# Patient Record
Sex: FEMALE | Race: WHITE | NOT HISPANIC OR LATINO | Employment: FULL TIME | URBAN - METROPOLITAN AREA
[De-identification: names, ages, dates, MRNs, and addresses within clinical notes are randomized per-mention and may not be internally consistent; named-entity substitution may affect disease eponyms.]

---

## 2023-05-08 DIAGNOSIS — E03.9 ACQUIRED HYPOTHYROIDISM: Primary | ICD-10-CM

## 2023-05-08 RX ORDER — LEVOTHYROXINE SODIUM 137 UG/1
TABLET ORAL
Qty: 90 TABLET | Refills: 1 | Status: SHIPPED | OUTPATIENT
Start: 2023-05-08 | End: 2023-10-23

## 2023-05-08 RX ORDER — LEVOTHYROXINE SODIUM 137 UG/1
137 TABLET ORAL DAILY
COMMUNITY

## 2023-07-13 RX ORDER — MULTIVITAMIN
1 TABLET ORAL DAILY
COMMUNITY
Start: 2018-07-16

## 2023-08-07 ASSESSMENT — PROMIS GLOBAL HEALTH SCALE
RATE_GENERAL_HEALTH: VERY GOOD
RATE_QUALITY_OF_LIFE: VERY GOOD
RATE_AVERAGE_PAIN: 0
RATE_SOCIAL_SATISFACTION: VERY GOOD
CARRYOUT_PHYSICAL_ACTIVITIES: COMPLETELY
RATE_AVERAGE_FATIGUE: MODERATE
RATE_MENTAL_HEALTH: VERY GOOD
EMOTIONAL_PROBLEMS: RARELY
CARRYOUT_SOCIAL_ACTIVITIES: VERY GOOD
RATE_PHYSICAL_HEALTH: VERY GOOD

## 2023-08-09 ENCOUNTER — OFFICE VISIT (OUTPATIENT)
Dept: PRIMARY CARE | Facility: CLINIC | Age: 48
End: 2023-08-09
Payer: COMMERCIAL

## 2023-08-09 ENCOUNTER — LAB (OUTPATIENT)
Dept: LAB | Facility: LAB | Age: 48
End: 2023-08-09
Payer: COMMERCIAL

## 2023-08-09 VITALS
WEIGHT: 158 LBS | BODY MASS INDEX: 25.39 KG/M2 | DIASTOLIC BLOOD PRESSURE: 78 MMHG | RESPIRATION RATE: 16 BRPM | HEIGHT: 66 IN | OXYGEN SATURATION: 98 % | HEART RATE: 88 BPM | TEMPERATURE: 96.8 F | SYSTOLIC BLOOD PRESSURE: 112 MMHG

## 2023-08-09 DIAGNOSIS — Z00.00 HEALTH MAINTENANCE EXAMINATION: ICD-10-CM

## 2023-08-09 DIAGNOSIS — Z00.00 HEALTHCARE MAINTENANCE: ICD-10-CM

## 2023-08-09 DIAGNOSIS — E03.9 HYPOTHYROIDISM, UNSPECIFIED TYPE: ICD-10-CM

## 2023-08-09 DIAGNOSIS — F51.01 PRIMARY INSOMNIA: Primary | ICD-10-CM

## 2023-08-09 DIAGNOSIS — Z12.31 ENCOUNTER FOR SCREENING MAMMOGRAM FOR MALIGNANT NEOPLASM OF BREAST: ICD-10-CM

## 2023-08-09 LAB
ALANINE AMINOTRANSFERASE (SGPT) (U/L) IN SER/PLAS: 15 U/L (ref 7–45)
ALBUMIN (G/DL) IN SER/PLAS: 4.2 G/DL (ref 3.4–5)
ALKALINE PHOSPHATASE (U/L) IN SER/PLAS: 44 U/L (ref 33–110)
ANION GAP IN SER/PLAS: 11 MMOL/L (ref 10–20)
ASPARTATE AMINOTRANSFERASE (SGOT) (U/L) IN SER/PLAS: 20 U/L (ref 9–39)
BILIRUBIN TOTAL (MG/DL) IN SER/PLAS: 0.9 MG/DL (ref 0–1.2)
CALCIUM (MG/DL) IN SER/PLAS: 9.3 MG/DL (ref 8.6–10.3)
CARBON DIOXIDE, TOTAL (MMOL/L) IN SER/PLAS: 26 MMOL/L (ref 21–32)
CHLORIDE (MMOL/L) IN SER/PLAS: 104 MMOL/L (ref 98–107)
CHOLESTEROL (MG/DL) IN SER/PLAS: 185 MG/DL (ref 0–199)
CHOLESTEROL IN HDL (MG/DL) IN SER/PLAS: 56.2 MG/DL
CHOLESTEROL/HDL RATIO: 3.3
CREATININE (MG/DL) IN SER/PLAS: 0.71 MG/DL (ref 0.5–1.05)
ERYTHROCYTE DISTRIBUTION WIDTH (RATIO) BY AUTOMATED COUNT: 11.9 % (ref 11.5–14.5)
ERYTHROCYTE MEAN CORPUSCULAR HEMOGLOBIN CONCENTRATION (G/DL) BY AUTOMATED: 34 G/DL (ref 32–36)
ERYTHROCYTE MEAN CORPUSCULAR VOLUME (FL) BY AUTOMATED COUNT: 93 FL (ref 80–100)
ERYTHROCYTES (10*6/UL) IN BLOOD BY AUTOMATED COUNT: 4.24 X10E12/L (ref 4–5.2)
GFR FEMALE: >90 ML/MIN/1.73M2
GLUCOSE (MG/DL) IN SER/PLAS: 84 MG/DL (ref 74–99)
HEMATOCRIT (%) IN BLOOD BY AUTOMATED COUNT: 39.4 % (ref 36–46)
HEMOGLOBIN (G/DL) IN BLOOD: 13.4 G/DL (ref 12–16)
LDL: 111 MG/DL (ref 0–99)
LEUKOCYTES (10*3/UL) IN BLOOD BY AUTOMATED COUNT: 5.8 X10E9/L (ref 4.4–11.3)
PLATELETS (10*3/UL) IN BLOOD AUTOMATED COUNT: 266 X10E9/L (ref 150–450)
POTASSIUM (MMOL/L) IN SER/PLAS: 4.2 MMOL/L (ref 3.5–5.3)
PROTEIN TOTAL: 7.2 G/DL (ref 6.4–8.2)
SODIUM (MMOL/L) IN SER/PLAS: 137 MMOL/L (ref 136–145)
THYROTROPIN (MIU/L) IN SER/PLAS BY DETECTION LIMIT <= 0.05 MIU/L: 1.93 MIU/L (ref 0.44–3.98)
TRIGLYCERIDE (MG/DL) IN SER/PLAS: 87 MG/DL (ref 0–149)
UREA NITROGEN (MG/DL) IN SER/PLAS: 11 MG/DL (ref 6–23)
VLDL: 17 MG/DL (ref 0–40)

## 2023-08-09 PROCEDURE — 80061 LIPID PANEL: CPT

## 2023-08-09 PROCEDURE — 36415 COLL VENOUS BLD VENIPUNCTURE: CPT

## 2023-08-09 PROCEDURE — 85027 COMPLETE CBC AUTOMATED: CPT

## 2023-08-09 PROCEDURE — 80053 COMPREHEN METABOLIC PANEL: CPT

## 2023-08-09 PROCEDURE — 84443 ASSAY THYROID STIM HORMONE: CPT

## 2023-08-09 PROCEDURE — 1036F TOBACCO NON-USER: CPT | Performed by: INTERNAL MEDICINE

## 2023-08-09 PROCEDURE — 99396 PREV VISIT EST AGE 40-64: CPT | Performed by: INTERNAL MEDICINE

## 2023-08-09 ASSESSMENT — PATIENT HEALTH QUESTIONNAIRE - PHQ9
2. FEELING DOWN, DEPRESSED OR HOPELESS: NOT AT ALL
SUM OF ALL RESPONSES TO PHQ9 QUESTIONS 1 AND 2: 0
1. LITTLE INTEREST OR PLEASURE IN DOING THINGS: NOT AT ALL

## 2023-08-09 ASSESSMENT — ENCOUNTER SYMPTOMS
UNEXPECTED WEIGHT CHANGE: 0
DIFFICULTY URINATING: 0
VOICE CHANGE: 0
ACTIVITY CHANGE: 0
COUGH: 0
ARTHRALGIAS: 0
LIGHT-HEADEDNESS: 0
HEMATURIA: 0
DIZZINESS: 0
BLOOD IN STOOL: 0
CONSTIPATION: 0
NAUSEA: 0
APPETITE CHANGE: 0
WHEEZING: 0
VOMITING: 0
FATIGUE: 0
TROUBLE SWALLOWING: 0
CHEST TIGHTNESS: 0
HEADACHES: 0
DIARRHEA: 0
PALPITATIONS: 0
TREMORS: 0

## 2023-08-09 NOTE — PROGRESS NOTES
Subjective   Patient ID: Karime Bailey is a 47 y.o. female who presents for Annual Exam.  46 yo here for a physical    No trouble falling asleep - wakes up at 2 am and is up till 6 am  Does not like how she feels on Melatonin  Tried CBD gummies  Advil pm consistently works    Caffeine - 1 cup in am - none after  Alcohol - weekends only - 2-3 each night  Exericises in morning    No anxiety          Review of Systems   Constitutional:  Negative for activity change, appetite change, fatigue and unexpected weight change.   HENT:  Negative for ear pain, hearing loss, tinnitus, trouble swallowing and voice change.    Eyes:  Negative for visual disturbance.   Respiratory:  Negative for cough, chest tightness and wheezing.    Cardiovascular:  Negative for chest pain, palpitations and leg swelling.   Gastrointestinal:  Negative for blood in stool, constipation, diarrhea, nausea and vomiting.   Genitourinary:  Negative for difficulty urinating, hematuria and vaginal bleeding.   Musculoskeletal:  Negative for arthralgias.   Skin:  Negative for rash.   Neurological:  Negative for dizziness, tremors, syncope, light-headedness and headaches.       Objective   Physical Exam  Constitutional:       General: She is not in acute distress.     Appearance: She is well-developed. She is not diaphoretic.   HENT:      Head: Normocephalic.      Right Ear: Tympanic membrane normal. There is no impacted cerumen.      Left Ear: Tympanic membrane normal. There is no impacted cerumen.      Nose: Nose normal.      Mouth/Throat:      Mouth: Mucous membranes are moist.      Pharynx: Oropharynx is clear. No oropharyngeal exudate or posterior oropharyngeal erythema.   Eyes:      General: No scleral icterus.     Extraocular Movements: Extraocular movements intact.      Conjunctiva/sclera: Conjunctivae normal.      Pupils: Pupils are equal, round, and reactive to light.   Neck:      Thyroid: No thyromegaly.      Vascular: No JVD.   Cardiovascular:       Rate and Rhythm: Normal rate and regular rhythm.      Pulses: Normal pulses.      Heart sounds: Normal heart sounds. No murmur heard.     No friction rub. No gallop.   Pulmonary:      Effort: Pulmonary effort is normal. No respiratory distress.      Breath sounds: Normal breath sounds. No wheezing or rales.   Chest:      Chest wall: No tenderness.   Abdominal:      General: Bowel sounds are normal. There is no distension.      Palpations: Abdomen is soft. There is no mass.      Tenderness: There is no abdominal tenderness. There is no rebound.   Musculoskeletal:         General: Normal range of motion.      Cervical back: Normal range of motion and neck supple.   Lymphadenopathy:      Cervical: No cervical adenopathy.   Skin:     General: Skin is warm and dry.   Neurological:      General: No focal deficit present.      Mental Status: She is alert and oriented to person, place, and time.      Deep Tendon Reflexes: Reflexes normal.   Psychiatric:         Mood and Affect: Mood normal.         Thought Content: Thought content normal.         Assessment/Plan   Problem List Items Addressed This Visit       Hypothyroidism    Primary insomnia - Primary    Current Assessment & Plan     Will try Melatonin or continue Benadryl 12.5-25  At hs          Other Visit Diagnoses       Health maintenance examination        Relevant Orders    Lipid Panel    TSH with reflex to Free T4 if abnormal    CBC    Healthcare maintenance        Relevant Orders    Comprehensive Metabolic Panel    Encounter for screening mammogram for malignant neoplasm of breast        Relevant Orders    BI mammo bilateral screening tomosynthesis

## 2023-10-21 DIAGNOSIS — E03.9 ACQUIRED HYPOTHYROIDISM: ICD-10-CM

## 2023-10-23 RX ORDER — LEVOTHYROXINE SODIUM 137 UG/1
137 TABLET ORAL DAILY
Qty: 90 TABLET | Refills: 1 | Status: SHIPPED | OUTPATIENT
Start: 2023-10-23

## 2023-11-28 ENCOUNTER — ANCILLARY PROCEDURE (OUTPATIENT)
Dept: RADIOLOGY | Facility: CLINIC | Age: 48
End: 2023-11-28
Payer: COMMERCIAL

## 2023-11-28 DIAGNOSIS — Z12.31 ENCOUNTER FOR SCREENING MAMMOGRAM FOR MALIGNANT NEOPLASM OF BREAST: ICD-10-CM

## 2023-11-28 PROCEDURE — 77063 BREAST TOMOSYNTHESIS BI: CPT | Mod: BILATERAL PROCEDURE | Performed by: RADIOLOGY

## 2023-11-28 PROCEDURE — 77067 SCR MAMMO BI INCL CAD: CPT | Mod: BILATERAL PROCEDURE | Performed by: RADIOLOGY

## 2023-11-28 PROCEDURE — 77063 BREAST TOMOSYNTHESIS BI: CPT

## 2023-11-28 PROCEDURE — 77067 SCR MAMMO BI INCL CAD: CPT

## 2024-07-19 DIAGNOSIS — E03.9 ACQUIRED HYPOTHYROIDISM: ICD-10-CM

## 2024-07-19 RX ORDER — LEVOTHYROXINE SODIUM 137 UG/1
137 TABLET ORAL DAILY
Qty: 90 TABLET | Refills: 0 | Status: SHIPPED | OUTPATIENT
Start: 2024-07-19

## 2024-08-12 ASSESSMENT — PROMIS GLOBAL HEALTH SCALE
CARRYOUT_PHYSICAL_ACTIVITIES: COMPLETELY
EMOTIONAL_PROBLEMS: RARELY
RATE_AVERAGE_PAIN: 0
RATE_GENERAL_HEALTH: VERY GOOD
RATE_MENTAL_HEALTH: VERY GOOD
RATE_QUALITY_OF_LIFE: VERY GOOD
CARRYOUT_SOCIAL_ACTIVITIES: VERY GOOD
RATE_PHYSICAL_HEALTH: VERY GOOD
RATE_AVERAGE_FATIGUE: MODERATE
RATE_SOCIAL_SATISFACTION: VERY GOOD

## 2024-08-14 ENCOUNTER — LAB (OUTPATIENT)
Dept: LAB | Facility: LAB | Age: 49
End: 2024-08-14
Payer: COMMERCIAL

## 2024-08-14 ENCOUNTER — APPOINTMENT (OUTPATIENT)
Dept: PRIMARY CARE | Facility: CLINIC | Age: 49
End: 2024-08-14
Payer: COMMERCIAL

## 2024-08-14 VITALS
RESPIRATION RATE: 16 BRPM | TEMPERATURE: 97.3 F | OXYGEN SATURATION: 99 % | WEIGHT: 133 LBS | HEIGHT: 66 IN | BODY MASS INDEX: 21.38 KG/M2 | SYSTOLIC BLOOD PRESSURE: 103 MMHG | DIASTOLIC BLOOD PRESSURE: 67 MMHG | HEART RATE: 78 BPM

## 2024-08-14 DIAGNOSIS — R07.89 ATYPICAL CHEST PAIN: ICD-10-CM

## 2024-08-14 DIAGNOSIS — Z00.00 ROUTINE GENERAL MEDICAL EXAMINATION AT A HEALTH CARE FACILITY: Primary | ICD-10-CM

## 2024-08-14 DIAGNOSIS — Z12.31 VISIT FOR SCREENING MAMMOGRAM: ICD-10-CM

## 2024-08-14 DIAGNOSIS — E03.9 ACQUIRED HYPOTHYROIDISM: ICD-10-CM

## 2024-08-14 DIAGNOSIS — Z00.00 ROUTINE GENERAL MEDICAL EXAMINATION AT A HEALTH CARE FACILITY: ICD-10-CM

## 2024-08-14 DIAGNOSIS — F51.01 PRIMARY INSOMNIA: ICD-10-CM

## 2024-08-14 LAB
ALBUMIN SERPL BCP-MCNC: 4.1 G/DL (ref 3.4–5)
ALP SERPL-CCNC: 43 U/L (ref 33–110)
ALT SERPL W P-5'-P-CCNC: 13 U/L (ref 7–45)
ANION GAP SERPL CALC-SCNC: 10 MMOL/L (ref 10–20)
AST SERPL W P-5'-P-CCNC: 18 U/L (ref 9–39)
BILIRUB SERPL-MCNC: 1.3 MG/DL (ref 0–1.2)
BUN SERPL-MCNC: 14 MG/DL (ref 6–23)
CALCIUM SERPL-MCNC: 9.3 MG/DL (ref 8.6–10.6)
CHLORIDE SERPL-SCNC: 104 MMOL/L (ref 98–107)
CHOLEST SERPL-MCNC: 172 MG/DL (ref 0–199)
CHOLESTEROL/HDL RATIO: 2.7
CO2 SERPL-SCNC: 27 MMOL/L (ref 21–32)
CREAT SERPL-MCNC: 0.75 MG/DL (ref 0.5–1.05)
EGFRCR SERPLBLD CKD-EPI 2021: >90 ML/MIN/1.73M*2
ERYTHROCYTE [DISTWIDTH] IN BLOOD BY AUTOMATED COUNT: 11.4 % (ref 11.5–14.5)
GLUCOSE SERPL-MCNC: 84 MG/DL (ref 74–99)
HCT VFR BLD AUTO: 43.1 % (ref 36–46)
HDLC SERPL-MCNC: 64.5 MG/DL
HGB BLD-MCNC: 14.1 G/DL (ref 12–16)
LDLC SERPL CALC-MCNC: 90 MG/DL
MCH RBC QN AUTO: 31.1 PG (ref 26–34)
MCHC RBC AUTO-ENTMCNC: 32.7 G/DL (ref 32–36)
MCV RBC AUTO: 95 FL (ref 80–100)
NON HDL CHOLESTEROL: 108 MG/DL (ref 0–149)
NRBC BLD-RTO: 0 /100 WBCS (ref 0–0)
PLATELET # BLD AUTO: 247 X10*3/UL (ref 150–450)
POTASSIUM SERPL-SCNC: 4.1 MMOL/L (ref 3.5–5.3)
PROT SERPL-MCNC: 7.2 G/DL (ref 6.4–8.2)
RBC # BLD AUTO: 4.54 X10*6/UL (ref 4–5.2)
SODIUM SERPL-SCNC: 137 MMOL/L (ref 136–145)
TRIGL SERPL-MCNC: 87 MG/DL (ref 0–149)
TSH SERPL-ACNC: 2.42 MIU/L (ref 0.44–3.98)
VLDL: 17 MG/DL (ref 0–40)
WBC # BLD AUTO: 5.9 X10*3/UL (ref 4.4–11.3)

## 2024-08-14 PROCEDURE — 85027 COMPLETE CBC AUTOMATED: CPT

## 2024-08-14 PROCEDURE — 36415 COLL VENOUS BLD VENIPUNCTURE: CPT

## 2024-08-14 PROCEDURE — 1036F TOBACCO NON-USER: CPT | Performed by: INTERNAL MEDICINE

## 2024-08-14 PROCEDURE — 84443 ASSAY THYROID STIM HORMONE: CPT

## 2024-08-14 PROCEDURE — 80053 COMPREHEN METABOLIC PANEL: CPT

## 2024-08-14 PROCEDURE — 93000 ELECTROCARDIOGRAM COMPLETE: CPT | Performed by: INTERNAL MEDICINE

## 2024-08-14 PROCEDURE — 99396 PREV VISIT EST AGE 40-64: CPT | Performed by: INTERNAL MEDICINE

## 2024-08-14 PROCEDURE — 80061 LIPID PANEL: CPT

## 2024-08-14 PROCEDURE — 3008F BODY MASS INDEX DOCD: CPT | Performed by: INTERNAL MEDICINE

## 2024-08-14 RX ORDER — SAME BUTANEDISULFONATE/BETAINE 400-600 MG
POWDER IN PACKET (EA) ORAL
COMMUNITY

## 2024-08-14 RX ORDER — LEVOTHYROXINE SODIUM 137 UG/1
137 TABLET ORAL DAILY
Qty: 90 TABLET | Refills: 3 | Status: SHIPPED | OUTPATIENT
Start: 2024-08-14 | End: 2025-08-14

## 2024-08-14 RX ORDER — LEVOTHYROXINE SODIUM 137 UG/1
137 TABLET ORAL DAILY
Qty: 90 TABLET | Refills: 0 | Status: SHIPPED | OUTPATIENT
Start: 2024-08-14 | End: 2024-08-14

## 2024-08-14 ASSESSMENT — ENCOUNTER SYMPTOMS
VOMITING: 0
CHEST TIGHTNESS: 1
WHEEZING: 0
UNEXPECTED WEIGHT CHANGE: 0
DIZZINESS: 0
ARTHRALGIAS: 0
CONSTIPATION: 0
COUGH: 0
PALPITATIONS: 0
HEMATURIA: 0
TREMORS: 0
APPETITE CHANGE: 0
TROUBLE SWALLOWING: 0
DIARRHEA: 0
BLOOD IN STOOL: 0
NAUSEA: 0
HEADACHES: 0
ACTIVITY CHANGE: 0
VOICE CHANGE: 0
FATIGUE: 0
DIFFICULTY URINATING: 0
LIGHT-HEADEDNESS: 0

## 2024-08-14 ASSESSMENT — PATIENT HEALTH QUESTIONNAIRE - PHQ9
1. LITTLE INTEREST OR PLEASURE IN DOING THINGS: NOT AT ALL
SUM OF ALL RESPONSES TO PHQ9 QUESTIONS 1 AND 2: 0
2. FEELING DOWN, DEPRESSED OR HOPELESS: NOT AT ALL

## 2024-08-14 NOTE — PROGRESS NOTES
Subjective   Patient ID: Karime Bailey is a 48 y.o. female who presents for Annual Exam.    48 y.o. here for a AMWV/physical  Has been feeling well  No active complaints today  Last night was laying down in bed on her side had a squeezing sensation in her chest - lasted an hour; nrelieved with advil; came in waves  Walked her dog - small   No dyspnea  Deep breath hurt  Nothing unusual for dinner - chicken parm - no burning  [place]    This happened a month ago also  Never has pain with exercise    Saw Dr Landa for insomnia and perimenopausal symptoms - took Vanessa for 3 months without much relief  Also Trazodone did not work      Employment - works at [employer]  Children - 3 (23-21-20)  Tob - Nonsmoker  Alcohol - 6-8 per week  Marijuana  - denies  Exercise - 4 days a week     Review of Systems   Constitutional:  Negative for activity change, appetite change, fatigue and unexpected weight change.   HENT:  Negative for ear pain, hearing loss, tinnitus, trouble swallowing and voice change.    Eyes:  Negative for visual disturbance.   Respiratory:  Positive for chest tightness. Negative for cough and wheezing.    Cardiovascular:  Positive for chest pain. Negative for palpitations and leg swelling.   Gastrointestinal:  Negative for blood in stool, constipation, diarrhea, nausea and vomiting.   Genitourinary:  Negative for difficulty urinating, hematuria and vaginal bleeding.   Musculoskeletal:  Negative for arthralgias.   Skin:  Negative for rash.   Neurological:  Negative for dizziness, tremors, syncope, light-headedness and headaches.       Objective   Vitals:    08/14/24 0758   BP: 103/67   Pulse: 78   Resp: 16   Temp: 36.3 °C (97.3 °F)   SpO2: 99%     Physical Exam  Constitutional:       General: She is not in acute distress.     Appearance: She is well-developed. She is not diaphoretic.   HENT:      Head: Normocephalic.      Right Ear: Tympanic membrane normal. There is no impacted cerumen.      Left Ear:  Tympanic membrane normal. There is no impacted cerumen.      Nose: Nose normal.      Mouth/Throat:      Mouth: Mucous membranes are moist.      Pharynx: Oropharynx is clear. No oropharyngeal exudate or posterior oropharyngeal erythema.   Eyes:      General: No scleral icterus.     Extraocular Movements: Extraocular movements intact.      Conjunctiva/sclera: Conjunctivae normal.      Pupils: Pupils are equal, round, and reactive to light.   Neck:      Thyroid: No thyromegaly.      Vascular: No JVD.   Cardiovascular:      Rate and Rhythm: Normal rate and regular rhythm.      Pulses: Normal pulses.      Heart sounds: Normal heart sounds. No murmur heard.     No friction rub. No gallop.   Pulmonary:      Effort: Pulmonary effort is normal. No respiratory distress.      Breath sounds: Normal breath sounds. No wheezing or rales.   Chest:      Chest wall: No tenderness.   Abdominal:      General: Bowel sounds are normal. There is no distension.      Palpations: Abdomen is soft. There is no mass.      Tenderness: There is no abdominal tenderness. There is no rebound.   Musculoskeletal:         General: Normal range of motion.      Cervical back: Normal range of motion and neck supple.   Lymphadenopathy:      Cervical: No cervical adenopathy.   Skin:     General: Skin is warm and dry.   Neurological:      General: No focal deficit present.      Mental Status: She is alert and oriented to person, place, and time.      Deep Tendon Reflexes: Reflexes normal.   Psychiatric:         Mood and Affect: Mood normal.         Thought Content: Thought content normal.         Assessment/Plan   Problem List Items Addressed This Visit       Hypothyroidism    Relevant Medications    levothyroxine (Synthroid, Levoxyl) 137 mcg tablet    Primary insomnia    Current Assessment & Plan     Takes benadryl 25mg at bedtime with good relief         Atypical chest pain    Current Assessment & Plan     ?esophageal spasm  Recommend she antwan back if pain  is recurrent           Relevant Orders    ECG 12 Lead (Completed)     Other Visit Diagnoses       Routine general medical examination at a health care facility    -  Primary    Relevant Orders    CBC    Comprehensive Metabolic Panel    Lipid Panel    TSH with reflex to Free T4 if abnormal    Visit for screening mammogram        Relevant Orders    BI mammo bilateral screening tomosynthesis             Follow up with me in 1 year

## 2024-12-04 ENCOUNTER — HOSPITAL ENCOUNTER (OUTPATIENT)
Dept: RADIOLOGY | Facility: CLINIC | Age: 49
Discharge: HOME | End: 2024-12-04
Payer: COMMERCIAL

## 2024-12-04 VITALS — WEIGHT: 130 LBS | BODY MASS INDEX: 20.89 KG/M2 | HEIGHT: 66 IN

## 2024-12-04 DIAGNOSIS — Z12.31 VISIT FOR SCREENING MAMMOGRAM: ICD-10-CM

## 2024-12-04 PROCEDURE — 77067 SCR MAMMO BI INCL CAD: CPT | Performed by: RADIOLOGY

## 2024-12-04 PROCEDURE — 77063 BREAST TOMOSYNTHESIS BI: CPT | Performed by: RADIOLOGY

## 2024-12-04 PROCEDURE — 77067 SCR MAMMO BI INCL CAD: CPT

## 2025-01-23 ENCOUNTER — TELEPHONE (OUTPATIENT)
Dept: PRIMARY CARE | Facility: CLINIC | Age: 50
End: 2025-01-23
Payer: COMMERCIAL

## 2025-01-24 ENCOUNTER — OFFICE VISIT (OUTPATIENT)
Dept: PRIMARY CARE | Facility: CLINIC | Age: 50
End: 2025-01-24
Payer: COMMERCIAL

## 2025-01-24 VITALS
HEART RATE: 70 BPM | SYSTOLIC BLOOD PRESSURE: 120 MMHG | TEMPERATURE: 98 F | HEIGHT: 66 IN | BODY MASS INDEX: 21.24 KG/M2 | WEIGHT: 132.2 LBS | DIASTOLIC BLOOD PRESSURE: 60 MMHG | RESPIRATION RATE: 16 BRPM | OXYGEN SATURATION: 99 %

## 2025-01-24 DIAGNOSIS — E03.9 HYPOTHYROIDISM, UNSPECIFIED TYPE: ICD-10-CM

## 2025-01-24 DIAGNOSIS — R09.81 SINUS CONGESTION: Primary | ICD-10-CM

## 2025-01-24 PROCEDURE — 3008F BODY MASS INDEX DOCD: CPT | Performed by: INTERNAL MEDICINE

## 2025-01-24 PROCEDURE — 99213 OFFICE O/P EST LOW 20 MIN: CPT | Performed by: INTERNAL MEDICINE

## 2025-01-24 ASSESSMENT — PATIENT HEALTH QUESTIONNAIRE - PHQ9
2. FEELING DOWN, DEPRESSED OR HOPELESS: NOT AT ALL
1. LITTLE INTEREST OR PLEASURE IN DOING THINGS: NOT AT ALL
SUM OF ALL RESPONSES TO PHQ9 QUESTIONS 1 AND 2: 0

## 2025-01-24 NOTE — PROGRESS NOTES
Subjective   Patient ID: Karime Bailey is a 49 y.o. female who presents for Sinus Problem (Pt is here due to a sinus problem . Dr. Keith pt ).    HPI   This is 48 yo patient of Dr. Keith with h/o Hypothyroidism and Insomnia.  I reviewed patient pertinent medical history from [facility] records.    She comes today with c/o sinus congestion for last 2 months.    She did not have URI symptoms in last 2 months other than sinus congestion which happens daily and starts/gets worse around 5 pm every day. It does not matter if she is at home at that time or outside the house.     Denies fever, chills, no nasal discharge, no ear ache, no cough.    She tried OTC saline spray, flonase, nasal decongestant. Only nasal decongestant gives her temporary relief and she has been using it daily. She stopped flonase.  We discussed that she should stop nasal decongestant, she can continue saline spray and Flonase daily.    We will refer to ENT for further evaluation.         Review of Systems   Constitutional: Negative.  Negative for activity change, appetite change and unexpected weight change.   HENT:  Positive for congestion. Negative for ear discharge, ear pain, hearing loss, mouth sores, nosebleeds, sinus pressure, sinus pain, sore throat, trouble swallowing and voice change.    Eyes: Negative.  Negative for pain, discharge, redness and visual disturbance.   Respiratory: Negative.  Negative for cough, chest tightness, shortness of breath, wheezing and stridor.    Cardiovascular: Negative.  Negative for chest pain, palpitations and leg swelling.   Gastrointestinal: Negative.  Negative for abdominal pain, blood in stool, constipation, diarrhea, nausea and vomiting.   Endocrine: Negative.  Negative for polydipsia, polyphagia and polyuria.   Genitourinary: Negative.  Negative for difficulty urinating, dysuria, flank pain, frequency and urgency.   Musculoskeletal: Negative.  Negative for arthralgias, back pain, gait problem, joint  "swelling, myalgias, neck pain and neck stiffness.   Skin: Negative.  Negative for color change, rash and wound.   Allergic/Immunologic: Negative.  Negative for environmental allergies, food allergies and immunocompromised state.   Neurological: Negative.  Negative for dizziness, tremors, seizures, syncope, facial asymmetry, speech difficulty, weakness, light-headedness, numbness and headaches.   Hematological: Negative.  Negative for adenopathy. Does not bruise/bleed easily.   Psychiatric/Behavioral: Negative.  Negative for agitation, behavioral problems, confusion, hallucinations, sleep disturbance and suicidal ideas. The patient is not nervous/anxious.    All other systems reviewed and are negative.      Objective   /60 (BP Location: Left arm, Patient Position: Sitting, BP Cuff Size: Adult)   Pulse 70   Temp 36.7 °C (98 °F) (Temporal)   Resp 16   Ht 1.676 m (5' 6\")   Wt 60 kg (132 lb 3.2 oz)   SpO2 99%   BMI 21.34 kg/m²     Physical Exam  Vitals and nursing note reviewed.   Constitutional:       General: She is not in acute distress.     Appearance: Normal appearance.   HENT:      Head: Normocephalic and atraumatic.      Right Ear: Tympanic membrane, ear canal and external ear normal.      Left Ear: Tympanic membrane, ear canal and external ear normal.      Nose: Nose normal. No congestion or rhinorrhea.      Mouth/Throat:      Mouth: Mucous membranes are moist.      Pharynx: Oropharynx is clear.   Eyes:      General:         Right eye: No discharge.         Left eye: No discharge.      Extraocular Movements: Extraocular movements intact.      Conjunctiva/sclera: Conjunctivae normal.      Pupils: Pupils are equal, round, and reactive to light.   Cardiovascular:      Rate and Rhythm: Normal rate and regular rhythm.      Pulses: Normal pulses.      Heart sounds: Normal heart sounds. No murmur heard.     No friction rub. No gallop.   Pulmonary:      Effort: Pulmonary effort is normal. No respiratory " distress.      Breath sounds: Normal breath sounds. No stridor. No wheezing, rhonchi or rales.   Chest:      Chest wall: No tenderness.   Abdominal:      General: Bowel sounds are normal.      Palpations: Abdomen is soft. There is no mass.      Tenderness: There is no abdominal tenderness. There is no guarding or rebound.   Musculoskeletal:         General: No swelling or deformity. Normal range of motion.      Cervical back: Normal range of motion and neck supple. No rigidity or tenderness.      Right lower leg: No edema.      Left lower leg: No edema.   Lymphadenopathy:      Cervical: No cervical adenopathy.   Skin:     General: Skin is warm and dry.      Coloration: Skin is not jaundiced.      Findings: No bruising or erythema.   Neurological:      General: No focal deficit present.      Mental Status: She is alert and oriented to person, place, and time. Mental status is at baseline.      Cranial Nerves: No cranial nerve deficit.      Motor: No weakness.      Coordination: Coordination normal.      Gait: Gait normal.   Psychiatric:         Mood and Affect: Mood normal.         Behavior: Behavior normal.         Thought Content: Thought content normal.         Judgment: Judgment normal.         Assessment/Plan   Problem List Items Addressed This Visit             ICD-10-CM       ENT    Sinus congestion - Primary R09.81     Continue saline spray, Flonase. Discontinue decongestant nasal spray.  Consult ENT.           Relevant Orders    Referral to ENT       Endocrine/Metabolic    Hypothyroidism E03.9     Stable. Continue current treatment.        It was a pleasure to see you today.  I would like to remind you about importance of a healthy lifestyle in order to improve your well-being and live longer.  Try to engage in physical activities for at least 150 minutes per week.  Eat about 10 servings of fruits and vegetables daily. My advice is 2 servings of fruits and 8 servings of vegetables.  For vegetables choose at  least half of them green and at least half of them fresh.  Please avoid sugar, salt, fried food and saturated fat.    F/up with Dr. Keith as scheduled or sooner if needed.

## 2025-01-25 PROBLEM — R09.81 SINUS CONGESTION: Status: ACTIVE | Noted: 2025-01-25

## 2025-01-25 ASSESSMENT — ENCOUNTER SYMPTOMS
ENDOCRINE NEGATIVE: 1
CHEST TIGHTNESS: 0
WHEEZING: 0
CONSTITUTIONAL NEGATIVE: 1
FLANK PAIN: 0
CONFUSION: 0
EYES NEGATIVE: 1
BACK PAIN: 0
DIFFICULTY URINATING: 0
APPETITE CHANGE: 0
MUSCULOSKELETAL NEGATIVE: 1
ADENOPATHY: 0
VOICE CHANGE: 0
FREQUENCY: 0
RESPIRATORY NEGATIVE: 1
SORE THROAT: 0
BLOOD IN STOOL: 0
STRIDOR: 0
NECK PAIN: 0
NUMBNESS: 0
BRUISES/BLEEDS EASILY: 0
SLEEP DISTURBANCE: 0
LIGHT-HEADEDNESS: 0
POLYPHAGIA: 0
NEUROLOGICAL NEGATIVE: 1
POLYDIPSIA: 0
DIARRHEA: 0
ACTIVITY CHANGE: 0
HALLUCINATIONS: 0
COLOR CHANGE: 0
ARTHRALGIAS: 0
SINUS PRESSURE: 0
DYSURIA: 0
TROUBLE SWALLOWING: 0
UNEXPECTED WEIGHT CHANGE: 0
MYALGIAS: 0
SINUS PAIN: 0
WEAKNESS: 0
EYE DISCHARGE: 0
HEMATOLOGIC/LYMPHATIC NEGATIVE: 1
FACIAL ASYMMETRY: 0
COUGH: 0
TREMORS: 0
EYE REDNESS: 0
JOINT SWELLING: 0
SPEECH DIFFICULTY: 0
PSYCHIATRIC NEGATIVE: 1
HEADACHES: 0
DIZZINESS: 0
SHORTNESS OF BREATH: 0
NERVOUS/ANXIOUS: 0
SEIZURES: 0
WOUND: 0
ALLERGIC/IMMUNOLOGIC NEGATIVE: 1
ABDOMINAL PAIN: 0
NECK STIFFNESS: 0
EYE PAIN: 0
NAUSEA: 0
PALPITATIONS: 0
CONSTIPATION: 0
AGITATION: 0
GASTROINTESTINAL NEGATIVE: 1
CARDIOVASCULAR NEGATIVE: 1
VOMITING: 0

## 2025-02-20 ENCOUNTER — APPOINTMENT (OUTPATIENT)
Facility: CLINIC | Age: 50
End: 2025-02-20
Payer: COMMERCIAL

## 2025-02-20 VITALS
BODY MASS INDEX: 21.21 KG/M2 | TEMPERATURE: 96 F | DIASTOLIC BLOOD PRESSURE: 80 MMHG | WEIGHT: 132 LBS | HEIGHT: 66 IN | SYSTOLIC BLOOD PRESSURE: 121 MMHG

## 2025-02-20 DIAGNOSIS — T48.5X5A RHINITIS MEDICAMENTOSA: ICD-10-CM

## 2025-02-20 DIAGNOSIS — R09.81 CHRONIC NASAL CONGESTION: Primary | ICD-10-CM

## 2025-02-20 DIAGNOSIS — J31.0 RHINITIS MEDICAMENTOSA: ICD-10-CM

## 2025-02-20 PROCEDURE — 3008F BODY MASS INDEX DOCD: CPT | Performed by: OTOLARYNGOLOGY

## 2025-02-20 PROCEDURE — 99203 OFFICE O/P NEW LOW 30 MIN: CPT | Performed by: OTOLARYNGOLOGY

## 2025-02-20 PROCEDURE — 1036F TOBACCO NON-USER: CPT | Performed by: OTOLARYNGOLOGY

## 2025-02-20 NOTE — PROGRESS NOTES
Impression:  1. Chronic nasal congestion        2. Rhinitis medicamentosa  Referral to ENT           RECOMMENDATIONS/PLAN :  I reassured the patient there is no evidence of any mass or nasal polyps in her nose however she must continue using Flonase-2 puffs each nostril daily.  I also want her to flush her nose using saline rinses and her swelling in the nose should come down over the next couple of months.  She will avoid using over-the-counter nasal spray.      **This electronic medical record note was created with the use of voice recognition software.  Despite proofreading, typographical or grammatical errors may be present that could affect meaning of content **    Subjective   Patient ID:     Karime Bailey is a 49 y.o. female who presents to the office today complaining of nasal congestion that seems to worsen at night.  She had been using some over-the-counter nasal spray and then her symptoms worsened.  She recently was switched over to Flonase nasal spray however she still complains of congestion at night.  This is interfering with how she is sleeping.  She denies repetitive sinus infections or any significant allergy issues.    ROS:  A detailed 12 system review of systems is noted on the intake form has been reviewed with the patient with details noted in the HPI and scanned into the patient's medical record.    Objective     Past Medical History:   Diagnosis Date    Acute upper respiratory infection, unspecified 01/07/2019    Viral URI with cough    Other conditions influencing health status 01/07/2019    History of cough    Streptococcal pharyngitis 01/07/2019    Strep pharyngitis    Unspecified symptoms and signs involving the genitourinary system 05/05/2020    UTI symptoms        Past Surgical History:   Procedure Laterality Date    APPENDECTOMY  06/23/2015    Appendectomy        Allergies   Allergen Reactions    Gluten Unknown    Wheat Unknown          Current Outpatient Medications:      "cholecalciferol, vitD3,/vit K2 (vitamin D3-vitamin K2) 125-90 mcg capsule, Take by mouth., Disp: , Rfl:     levothyroxine (Synthroid, Levoxyl) 137 mcg tablet, Take 1 tablet (137 mcg) by mouth once daily., Disp: 90 tablet, Rfl: 3    magnesium 30 mg tablet, Take 1 tablet (30 mg) by mouth 2 times a day. qhs, Disp: , Rfl:     multivitamin tablet, Take 1 tablet by mouth once daily., Disp: , Rfl:      Tobacco Use: Low Risk  (2/20/2025)    Patient History     Smoking Tobacco Use: Never     Smokeless Tobacco Use: Never     Passive Exposure: Not on file        Alcohol Use: Not on file        Social History     Substance and Sexual Activity   Drug Use Never        Physical Exam:  Visit Vitals  /80   Temp 35.6 °C (96 °F) (Temporal)   Ht 1.676 m (5' 6\")   Wt 59.9 kg (132 lb)   BMI 21.31 kg/m²   OB Status Having periods   Smoking Status Never   BSA 1.67 m²      General: Patient is alert, oriented, cooperative in no apparent distress.  Head: Normocephalic, atraumatic.  Eyes: PERRL, EOMI, Conjunctiva is clear. No nystagmus.  Ears: Right Ear-- Pinna is normal.  External auditory canal is patent. Tympanic membrane is [intact, translucent and has good mobility with my pneumatic otoscope. No effusion].  Mastoid is nontender.  Left ear-- Pinna is normal.  External auditory canal is patent. Tympanic membrane is [intact, translucent and has good mobility with my pneumatic otoscope.  No effusion].  Mastoid is nontender.  Nose: Septum is relatively straight.  No septal perforation or lesions. No septal hematoma/ seroma.  No signs of bleeding.  Inferior turbinates are moderately swollen.   No evidence of intranasal polyps.  No infectious drainage.  Throat:  Floor of mouth is clear, no masses.  Tongue appears normal, no lesions or masses. Gums, gingiva, buccal mucosa appear pink and moist, no lesions. Teeth are in good repair.  No obvious dental infections.  Peritonsillar regions appear symmetric without swelling.  Hard and soft palate " appear normal, no obvious cleft. Uvula is midline.  Oropharynx: No lesions. Retropharyngeal wall is flat.  No active postnasal drip.  Neck: Supple,  no lymphadenopathy.  No masses.  Salivary Glands: Symmetric bilaterally.  No palpable masses.  No evidence of acute infection or salivary stones  Neurologic: Cranial Nerves 2-12 are grossly intact without focal deficits. Cerebellar function testing is normal.     Results:   []    Procedure:   []    Matteo Ortiz, DO

## 2025-08-14 ENCOUNTER — APPOINTMENT (OUTPATIENT)
Dept: PRIMARY CARE | Facility: CLINIC | Age: 50
End: 2025-08-14
Payer: COMMERCIAL

## 2025-08-14 VITALS
SYSTOLIC BLOOD PRESSURE: 104 MMHG | WEIGHT: 134.4 LBS | TEMPERATURE: 96.6 F | BODY MASS INDEX: 21.6 KG/M2 | OXYGEN SATURATION: 97 % | HEIGHT: 66 IN | DIASTOLIC BLOOD PRESSURE: 70 MMHG | HEART RATE: 72 BPM

## 2025-08-14 DIAGNOSIS — F51.01 PRIMARY INSOMNIA: Primary | ICD-10-CM

## 2025-08-14 DIAGNOSIS — Z00.00 ROUTINE GENERAL MEDICAL EXAMINATION AT A HEALTH CARE FACILITY: ICD-10-CM

## 2025-08-14 DIAGNOSIS — Z12.31 ENCOUNTER FOR SCREENING MAMMOGRAM FOR MALIGNANT NEOPLASM OF BREAST: ICD-10-CM

## 2025-08-14 DIAGNOSIS — E03.9 ACQUIRED HYPOTHYROIDISM: ICD-10-CM

## 2025-08-14 PROBLEM — R07.89 ATYPICAL CHEST PAIN: Status: RESOLVED | Noted: 2024-08-14 | Resolved: 2025-08-14

## 2025-08-14 PROBLEM — R09.81 SINUS CONGESTION: Status: RESOLVED | Noted: 2025-01-25 | Resolved: 2025-08-14

## 2025-08-14 PROCEDURE — 99396 PREV VISIT EST AGE 40-64: CPT | Performed by: INTERNAL MEDICINE

## 2025-08-14 PROCEDURE — 1036F TOBACCO NON-USER: CPT | Performed by: INTERNAL MEDICINE

## 2025-08-14 PROCEDURE — 3008F BODY MASS INDEX DOCD: CPT | Performed by: INTERNAL MEDICINE

## 2025-08-14 RX ORDER — LEVOTHYROXINE SODIUM 137 UG/1
137 TABLET ORAL DAILY
Qty: 90 TABLET | Refills: 3 | Status: SHIPPED | OUTPATIENT
Start: 2025-08-14 | End: 2026-08-14

## 2025-08-14 ASSESSMENT — ENCOUNTER SYMPTOMS
APPETITE CHANGE: 0
TROUBLE SWALLOWING: 0
ACTIVITY CHANGE: 0
VOICE CHANGE: 0
HEADACHES: 0
DIARRHEA: 0
FATIGUE: 0
WHEEZING: 0
UNEXPECTED WEIGHT CHANGE: 0
CONSTIPATION: 0
NAUSEA: 0
DIZZINESS: 0
TREMORS: 0
LIGHT-HEADEDNESS: 0
BLOOD IN STOOL: 0
ARTHRALGIAS: 0
CHEST TIGHTNESS: 0
PALPITATIONS: 0
VOMITING: 0
DIFFICULTY URINATING: 0
HEMATURIA: 0
COUGH: 0

## 2025-08-15 LAB
ALBUMIN SERPL-MCNC: 4.4 G/DL (ref 3.6–5.1)
ALP SERPL-CCNC: 46 U/L (ref 31–125)
ALT SERPL-CCNC: 14 U/L (ref 6–29)
ANION GAP SERPL CALCULATED.4IONS-SCNC: 8 MMOL/L (CALC) (ref 7–17)
AST SERPL-CCNC: 17 U/L (ref 10–35)
BILIRUB SERPL-MCNC: 1.5 MG/DL (ref 0.2–1.2)
BUN SERPL-MCNC: 13 MG/DL (ref 7–25)
CALCIUM SERPL-MCNC: 9.3 MG/DL (ref 8.6–10.2)
CHLORIDE SERPL-SCNC: 104 MMOL/L (ref 98–110)
CHOLEST SERPL-MCNC: 169 MG/DL
CHOLEST/HDLC SERPL: 2.3 (CALC)
CO2 SERPL-SCNC: 26 MMOL/L (ref 20–32)
CREAT SERPL-MCNC: 0.76 MG/DL (ref 0.5–0.99)
EGFRCR SERPLBLD CKD-EPI 2021: 96 ML/MIN/1.73M2
ERYTHROCYTE [DISTWIDTH] IN BLOOD BY AUTOMATED COUNT: 12.4 % (ref 11–15)
GLUCOSE SERPL-MCNC: 79 MG/DL (ref 65–99)
HCT VFR BLD AUTO: 43.9 % (ref 35–45)
HDLC SERPL-MCNC: 72 MG/DL
HGB BLD-MCNC: 14.4 G/DL (ref 11.7–15.5)
LDLC SERPL CALC-MCNC: 81 MG/DL (CALC)
MCH RBC QN AUTO: 32.1 PG (ref 27–33)
MCHC RBC AUTO-ENTMCNC: 32.8 G/DL (ref 32–36)
MCV RBC AUTO: 97.8 FL (ref 80–100)
NONHDLC SERPL-MCNC: 97 MG/DL (CALC)
PLATELET # BLD AUTO: 261 THOUSAND/UL (ref 140–400)
PMV BLD REES-ECKER: 10.3 FL (ref 7.5–12.5)
POTASSIUM SERPL-SCNC: 4.3 MMOL/L (ref 3.5–5.3)
PROT SERPL-MCNC: 7.1 G/DL (ref 6.1–8.1)
RBC # BLD AUTO: 4.49 MILLION/UL (ref 3.8–5.1)
SODIUM SERPL-SCNC: 138 MMOL/L (ref 135–146)
TRIGL SERPL-MCNC: 75 MG/DL
TSH SERPL-ACNC: 1.95 MIU/L
WBC # BLD AUTO: 4.1 THOUSAND/UL (ref 3.8–10.8)

## 2026-08-20 ENCOUNTER — APPOINTMENT (OUTPATIENT)
Dept: PRIMARY CARE | Facility: CLINIC | Age: 51
End: 2026-08-20
Payer: COMMERCIAL